# Patient Record
Sex: FEMALE | Race: WHITE | Employment: PART TIME | URBAN - METROPOLITAN AREA
[De-identification: names, ages, dates, MRNs, and addresses within clinical notes are randomized per-mention and may not be internally consistent; named-entity substitution may affect disease eponyms.]

---

## 2024-02-23 ENCOUNTER — HOSPITAL ENCOUNTER (INPATIENT)
Facility: HOSPITAL | Age: 74
LOS: 3 days | Discharge: HOME | End: 2024-02-26
Attending: STUDENT IN AN ORGANIZED HEALTH CARE EDUCATION/TRAINING PROGRAM | Admitting: HOSPITALIST
Payer: MEDICARE

## 2024-02-23 ENCOUNTER — APPOINTMENT (OUTPATIENT)
Dept: GENERAL RADIOLOGY | Facility: HOSPITAL | Age: 74
End: 2024-02-23
Attending: STUDENT IN AN ORGANIZED HEALTH CARE EDUCATION/TRAINING PROGRAM
Payer: MEDICARE

## 2024-02-23 DIAGNOSIS — R91.8 MASS OF LOWER LOBE OF LEFT LUNG: ICD-10-CM

## 2024-02-23 DIAGNOSIS — J44.1 COPD EXACERBATION (HCC): Primary | ICD-10-CM

## 2024-02-23 PROBLEM — J96.01 ACUTE RESPIRATORY FAILURE WITH HYPOXIA (HCC): Status: ACTIVE | Noted: 2024-02-23

## 2024-02-23 LAB
ADENOVIRUS PCR:: NOT DETECTED
ALBUMIN SERPL-MCNC: 3.4 G/DL (ref 3.4–5)
ALBUMIN/GLOB SERPL: 0.9 {RATIO} (ref 1–2)
ALP LIVER SERPL-CCNC: 100 U/L
ALT SERPL-CCNC: 16 U/L
ANION GAP SERPL CALC-SCNC: 7 MMOL/L (ref 0–18)
ARTERIAL PATENCY WRIST A: POSITIVE
AST SERPL-CCNC: 23 U/L (ref 15–37)
B PARAPERT DNA SPEC QL NAA+PROBE: NOT DETECTED
B PERT DNA SPEC QL NAA+PROBE: NOT DETECTED
BASE EXCESS BLDA CALC-SCNC: -2.6 MMOL/L (ref ?–2)
BASOPHILS # BLD AUTO: 0.02 X10(3) UL (ref 0–0.2)
BASOPHILS NFR BLD AUTO: 0.1 %
BILIRUB SERPL-MCNC: 0.7 MG/DL (ref 0.1–2)
BODY TEMPERATURE: 98.6 F
BUN BLD-MCNC: 26 MG/DL (ref 9–23)
C PNEUM DNA SPEC QL NAA+PROBE: NOT DETECTED
CA-I BLD-SCNC: 1.18 MMOL/L (ref 0.95–1.32)
CALCIUM BLD-MCNC: 9.1 MG/DL (ref 8.5–10.1)
CHLORIDE SERPL-SCNC: 106 MMOL/L (ref 98–112)
CO2 SERPL-SCNC: 23 MMOL/L (ref 21–32)
COHGB MFR BLD: 1.3 % SAT (ref 0–3)
CORONAVIRUS 229E PCR:: NOT DETECTED
CORONAVIRUS HKU1 PCR:: NOT DETECTED
CORONAVIRUS NL63 PCR:: NOT DETECTED
CORONAVIRUS OC43 PCR:: NOT DETECTED
CREAT BLD-MCNC: 1.27 MG/DL
EGFRCR SERPLBLD CKD-EPI 2021: 44 ML/MIN/1.73M2 (ref 60–?)
EOSINOPHIL # BLD AUTO: 0 X10(3) UL (ref 0–0.7)
EOSINOPHIL NFR BLD AUTO: 0 %
ERYTHROCYTE [DISTWIDTH] IN BLOOD BY AUTOMATED COUNT: 14.2 %
FIO2: 40 %
FLUAV + FLUBV RNA SPEC NAA+PROBE: NEGATIVE
FLUAV + FLUBV RNA SPEC NAA+PROBE: NEGATIVE
FLUAV RNA SPEC QL NAA+PROBE: NOT DETECTED
FLUBV RNA SPEC QL NAA+PROBE: NOT DETECTED
GLOBULIN PLAS-MCNC: 3.6 G/DL (ref 2.8–4.4)
GLUCOSE BLD-MCNC: 179 MG/DL (ref 70–99)
HCO3 BLDA-SCNC: 22.9 MEQ/L (ref 21–27)
HCT VFR BLD AUTO: 41.2 %
HGB BLD-MCNC: 14 G/DL
HGB BLD-MCNC: 14.1 G/DL
IMM GRANULOCYTES # BLD AUTO: 0.1 X10(3) UL (ref 0–1)
IMM GRANULOCYTES NFR BLD: 0.5 %
L/M: 40 L/MIN
LACTATE BLD-SCNC: 1.5 MMOL/L (ref 0.5–2)
LYMPHOCYTES # BLD AUTO: 0.4 X10(3) UL (ref 1–4)
LYMPHOCYTES NFR BLD AUTO: 2 %
MCH RBC QN AUTO: 28.5 PG (ref 26–34)
MCHC RBC AUTO-ENTMCNC: 34 G/DL (ref 31–37)
MCV RBC AUTO: 83.9 FL
METAPNEUMOVIRUS PCR:: NOT DETECTED
METHGB MFR BLD: 0.4 % SAT (ref 0.4–1.5)
MONOCYTES # BLD AUTO: 0.6 X10(3) UL (ref 0.1–1)
MONOCYTES NFR BLD AUTO: 3.1 %
MYCOPLASMA PNEUMONIA PCR:: NOT DETECTED
NEUTROPHILS # BLD AUTO: 18.5 X10 (3) UL (ref 1.5–7.7)
NEUTROPHILS # BLD AUTO: 18.5 X10(3) UL (ref 1.5–7.7)
NEUTROPHILS NFR BLD AUTO: 94.3 %
OSMOLALITY SERPL CALC.SUM OF ELEC: 291 MOSM/KG (ref 275–295)
OXYHGB MFR BLDA: 96.1 % (ref 92–100)
PARAINFLUENZA 1 PCR:: NOT DETECTED
PARAINFLUENZA 2 PCR:: NOT DETECTED
PARAINFLUENZA 3 PCR:: NOT DETECTED
PARAINFLUENZA 4 PCR:: NOT DETECTED
PCO2 BLDA: 29 MM HG (ref 35–45)
PH BLDA: 7.45 [PH] (ref 7.35–7.45)
PLATELET # BLD AUTO: 206 10(3)UL (ref 150–450)
PO2 BLDA: 86 MM HG (ref 80–100)
POTASSIUM BLD-SCNC: 3.4 MMOL/L (ref 3.6–5.1)
POTASSIUM SERPL-SCNC: 3.6 MMOL/L (ref 3.5–5.1)
PROCALCITONIN SERPL-MCNC: 0.95 NG/ML (ref ?–0.16)
PROT SERPL-MCNC: 7 G/DL (ref 6.4–8.2)
RBC # BLD AUTO: 4.91 X10(6)UL
RHINOVIRUS/ENTERO PCR:: DETECTED
RSV RNA SPEC NAA+PROBE: NEGATIVE
RSV RNA SPEC QL NAA+PROBE: NOT DETECTED
SARS-COV-2 RNA NPH QL NAA+NON-PROBE: NOT DETECTED
SARS-COV-2 RNA RESP QL NAA+PROBE: NOT DETECTED
SODIUM BLD-SCNC: 133 MMOL/L (ref 135–145)
SODIUM SERPL-SCNC: 136 MMOL/L (ref 136–145)
TROPONIN I SERPL HS-MCNC: 6 NG/L
WBC # BLD AUTO: 19.6 X10(3) UL (ref 4–11)

## 2024-02-23 PROCEDURE — 99223 1ST HOSP IP/OBS HIGH 75: CPT | Performed by: HOSPITALIST

## 2024-02-23 PROCEDURE — 5A0945A ASSISTANCE WITH RESPIRATORY VENTILATION, 24-96 CONSECUTIVE HOURS, HIGH NASAL FLOW/VELOCITY: ICD-10-PCS | Performed by: HOSPITALIST

## 2024-02-23 PROCEDURE — 5A09357 ASSISTANCE WITH RESPIRATORY VENTILATION, LESS THAN 24 CONSECUTIVE HOURS, CONTINUOUS POSITIVE AIRWAY PRESSURE: ICD-10-PCS | Performed by: HOSPITALIST

## 2024-02-23 PROCEDURE — 71045 X-RAY EXAM CHEST 1 VIEW: CPT | Performed by: STUDENT IN AN ORGANIZED HEALTH CARE EDUCATION/TRAINING PROGRAM

## 2024-02-23 RX ORDER — FLUTICASONE PROPIONATE 50 MCG
2 SPRAY, SUSPENSION (ML) NASAL DAILY
Status: DISCONTINUED | OUTPATIENT
Start: 2024-02-24 | End: 2024-02-26

## 2024-02-23 RX ORDER — CLONAZEPAM 0.5 MG/1
0.5 TABLET ORAL 2 TIMES DAILY PRN
Status: DISCONTINUED | OUTPATIENT
Start: 2024-02-23 | End: 2024-02-26

## 2024-02-23 RX ORDER — QUETIAPINE FUMARATE 25 MG/1
25 TABLET, FILM COATED ORAL NIGHTLY
Status: DISCONTINUED | OUTPATIENT
Start: 2024-02-23 | End: 2024-02-23

## 2024-02-23 RX ORDER — AZITHROMYCIN 250 MG/1
500 TABLET, FILM COATED ORAL ONCE
Status: COMPLETED | OUTPATIENT
Start: 2024-02-23 | End: 2024-02-23

## 2024-02-23 RX ORDER — METHYLPREDNISOLONE SODIUM SUCCINATE 40 MG/ML
40 INJECTION, POWDER, LYOPHILIZED, FOR SOLUTION INTRAMUSCULAR; INTRAVENOUS EVERY 8 HOURS
Status: DISCONTINUED | OUTPATIENT
Start: 2024-02-23 | End: 2024-02-26

## 2024-02-23 RX ORDER — ACETAMINOPHEN 500 MG
500 TABLET ORAL EVERY 4 HOURS PRN
Status: DISCONTINUED | OUTPATIENT
Start: 2024-02-23 | End: 2024-02-26

## 2024-02-23 RX ORDER — ATORVASTATIN CALCIUM 40 MG/1
40 TABLET, FILM COATED ORAL NIGHTLY
Status: DISCONTINUED | OUTPATIENT
Start: 2024-02-24 | End: 2024-02-26

## 2024-02-23 RX ORDER — CLONAZEPAM 0.5 MG/1
0.5 TABLET ORAL 2 TIMES DAILY PRN
COMMUNITY

## 2024-02-23 RX ORDER — POLYETHYLENE GLYCOL 3350 17 G/17G
17 POWDER, FOR SOLUTION ORAL DAILY PRN
Status: DISCONTINUED | OUTPATIENT
Start: 2024-02-23 | End: 2024-02-26

## 2024-02-23 RX ORDER — SODIUM CHLORIDE 9 MG/ML
INJECTION, SOLUTION INTRAVENOUS CONTINUOUS
Status: DISCONTINUED | OUTPATIENT
Start: 2024-02-23 | End: 2024-02-24

## 2024-02-23 RX ORDER — METHYLPREDNISOLONE SODIUM SUCCINATE 125 MG/2ML
125 INJECTION, POWDER, LYOPHILIZED, FOR SOLUTION INTRAMUSCULAR; INTRAVENOUS ONCE
Status: COMPLETED | OUTPATIENT
Start: 2024-02-23 | End: 2024-02-23

## 2024-02-23 RX ORDER — IPRATROPIUM BROMIDE AND ALBUTEROL SULFATE 2.5; .5 MG/3ML; MG/3ML
3 SOLUTION RESPIRATORY (INHALATION)
Status: DISCONTINUED | OUTPATIENT
Start: 2024-02-23 | End: 2024-02-26

## 2024-02-23 RX ORDER — MELATONIN
3 NIGHTLY PRN
Status: DISCONTINUED | OUTPATIENT
Start: 2024-02-23 | End: 2024-02-26

## 2024-02-23 RX ORDER — FLUTICASONE FUROATE AND VILANTEROL 200; 25 UG/1; UG/1
1 POWDER RESPIRATORY (INHALATION) DAILY
Status: DISCONTINUED | OUTPATIENT
Start: 2024-02-23 | End: 2024-02-26

## 2024-02-23 RX ORDER — ASPIRIN 325 MG
325 TABLET ORAL DAILY
Status: DISCONTINUED | OUTPATIENT
Start: 2024-02-23 | End: 2024-02-26

## 2024-02-23 RX ORDER — AMLODIPINE BESYLATE 10 MG/1
10 TABLET ORAL DAILY
COMMUNITY

## 2024-02-23 RX ORDER — BISACODYL 10 MG
10 SUPPOSITORY, RECTAL RECTAL
Status: DISCONTINUED | OUTPATIENT
Start: 2024-02-23 | End: 2024-02-26

## 2024-02-23 RX ORDER — HYDROCHLOROTHIAZIDE 12.5 MG/1
12.5 TABLET ORAL DAILY
Status: DISCONTINUED | OUTPATIENT
Start: 2024-02-23 | End: 2024-02-23

## 2024-02-23 RX ORDER — BUTALBITAL, ACETAMINOPHEN AND CAFFEINE 50; 325; 40 MG/1; MG/1; MG/1
1 TABLET ORAL EVERY 6 HOURS PRN
Status: DISCONTINUED | OUTPATIENT
Start: 2024-02-23 | End: 2024-02-26

## 2024-02-23 RX ORDER — AMLODIPINE BESYLATE 5 MG/1
10 TABLET ORAL DAILY
Status: DISCONTINUED | OUTPATIENT
Start: 2024-02-23 | End: 2024-02-26

## 2024-02-23 RX ORDER — ONDANSETRON 2 MG/ML
4 INJECTION INTRAMUSCULAR; INTRAVENOUS EVERY 6 HOURS PRN
Status: DISCONTINUED | OUTPATIENT
Start: 2024-02-23 | End: 2024-02-26

## 2024-02-23 RX ORDER — SENNOSIDES 8.6 MG
17.2 TABLET ORAL NIGHTLY PRN
Status: DISCONTINUED | OUTPATIENT
Start: 2024-02-23 | End: 2024-02-26

## 2024-02-23 RX ORDER — ENOXAPARIN SODIUM 100 MG/ML
30 INJECTION SUBCUTANEOUS DAILY
Status: DISCONTINUED | OUTPATIENT
Start: 2024-02-24 | End: 2024-02-24

## 2024-02-23 NOTE — ED INITIAL ASSESSMENT (HPI)
Patient presents with c/o three days of shortness of breath. Patient tachypneic on arrival with purse lipped breathing. Patient states she's having a \"pain like when you have pleurisy.\" Patient reports hx of COPD, emphysema. She has grandchildren that live with her that have been intermittently ill.

## 2024-02-23 NOTE — H&P
[facility]     Beata Sweeney Patient Status:  Emergency    1950 MRN [id number]   Location [facility] EMERGENCY DEPARTMENT Attending Rianna Doran MD   Hosp Day # 0 PCP Zelalem Rendon MD     Chief Complaint: SOB    Subjective:    History of Present Illness:     Beata Sweeney is a 73 year old female with a PMH of COPD and AAA presented to ED due to worsening SOB over the past 5 days or so. Reports dyspnea with minimal exertion, wheezing, and non-productive cough. Patient began taking some left over steroids about 5 days ago along with doing breathing treatments without much relief. Denies fever/chills. Denies N/V/D.    History/Other:    Past Medical History:  Past Medical History:   Diagnosis Date    COPD (chronic obstructive pulmonary disease) (HCC)      Past Surgical History:   History reviewed. No pertinent surgical history.   Family History:   No family history on file.  Social History:    reports that she quit smoking about 8 years ago. Her smoking use included cigarettes. She has a 51 pack-year smoking history. She has never used smokeless tobacco.     Allergies:   Allergies   Allergen Reactions    Lisinopril SWELLING     Lip/facial swelling     Sulfa Antibiotics RASH       Medications:    No current facility-administered medications on file prior to encounter.     Current Outpatient Medications on File Prior to Encounter   Medication Sig Dispense Refill    Albuterol Sulfate (PROAIR RESPICLICK) 108 (90 BASE) MCG/ACT Inhalation Aerosol Powder, Breath Activated Inhale 2 puffs into the lungs 4 (four) times daily as needed. 1 each 0    Umeclidinium Bromide (INCRUSE ELLIPTA) 62.5 MCG/INH Inhalation Aerosol Powder, Breath Activated Inhale 1 puff into the lungs daily. 1 each 0    Umeclidinium Bromide (INCRUSE ELLIPTA) 62.5 MCG/INH Inhalation Aerosol Powder, Breath Activated Inhale 1 puff into the lungs daily. 1 each 11    azithromycin 250 MG Oral Tab        predniSONE  10 MG Oral Tab Take 4 tabs po x 3 days then 3 tabs po x 3 days then 2 tabs po x 3 days then 1 tab po x 3 days 30 tablet 0    Tiotropium Bromide Monohydrate (SPIRIVA RESPIMAT) 2.5 MCG/ACT Inhalation Aero Soln Inhale 2 puffs into the lungs daily. 1 Inhaler 11    Fluticasone Propionate (FLONASE) 50 MCG/ACT Nasal Suspension 2 sprays by Each Nare route daily. 1 Inhaler 11    TAMOXIFEN CITRATE OR Take 10 mg by mouth.      Tamoxifen Citrate (NOLVADEX) 20 MG Oral Tab        ADVAIR DISKUS 500-50 MCG/DOSE Inhalation Aerosol Powder, Breath Activated        predniSONE (DELTASONE) 10 MG Oral Tab Take 4 tabs po x 3 days then 3 tabs po x 3 days then 2 tabs po x 3 days then 1 tab po x 3 days 30 tablet 0    Fluticasone Furoate-Vilanterol (BREO ELLIPTA) 100-25 MCG/INH Inhalation Aerosol Powder, Breath Activated Inhale 1 puff into the lungs daily. 1 each 0    Fluticasone Furoate-Vilanterol (BREO ELLIPTA) 100-25 MCG/INH Inhalation Aerosol Powder, Breath Activated Inhale 1 puff into the lungs daily. 1 each 11    predniSONE (DELTASONE) 10 MG Oral Tab Take 4 pills daily for 3 days, then take 3 pills daily for 3 days, then take 2 pills daily for 3 days, then take 1 pill daily for 3 days. 30 tablet 0    Albuterol Sulfate HFA (PROAIR HFA) 108 (90 BASE) MCG/ACT Inhalation Aero Soln Inhale  into the lungs as needed.      aspirin 325 MG Oral Tab Take 325 mg by mouth daily.      Butalbital-APAP-Caffeine (FIORICET, ESGIC) -40 MG Oral Tab Take 1 tablet by mouth as needed. As needed for migraines      diazepam (VALIUM) 5 MG Oral Tab   0    QUEtiapine Fumarate (SEROQUEL) 25 MG Oral Tab   11    Umeclidinium-Vilanterol (ANORO ELLIPTA) 62.5-25 MCG/INH Inhalation Aerosol Powder, Breath Activated Inhale 1 puff into the lungs daily. 1 each 11    Diltiazem HCl (CARDIZEM) 90 MG Oral Tab Take 90 mg by mouth 4 (four) times daily.      hydrochlorothiazide (HYDRODIURIL) 12.5 MG Oral Tab Take 12.5 mg by mouth daily.      Atorvastatin Calcium (LIPITOR) 40  MG Oral Tab Take 40 mg by mouth nightly.      methylphenidate (RITALIN) 10 MG Oral Tab Take 10 mg by mouth 2 (two) times daily.      ZOLOFT OR 50 mg Daily      VICODIN OR PRN         Review of Systems:   A comprehensive review of systems was completed.    Pertinent positives and negatives noted in the HPI.    Objective:   Physical Exam:    /81   Pulse 102   Temp 97.5 °F (36.4 °C) (Temporal)   Resp (!) 40   Ht 154.9 cm (5' 1\")   Wt 103 lb (46.7 kg)   SpO2 99%   BMI 19.46 kg/m²   General: No acute distress, Alert  Respiratory: Diminished with B/L wheezing.   Cardiovascular: S1, S2. Tachy.  Abdomen: Soft, Non-tender, non-distended, positive bowel sounds  Neuro: No new focal deficits  Extremities: No edema      Results:    Labs:      Labs Last 24 Hours:    Recent Labs   Lab 02/23/24  1559   RBC 4.91   HGB 14.0   HCT 41.2   MCV 83.9   MCH 28.5   MCHC 34.0   RDW 14.2   NEPRELIM 18.50*   WBC 19.6*   .0       Recent Labs   Lab 02/23/24  1559   *   BUN 26*   CREATSERUM 1.27*   EGFRCR 44*   CA 9.1   ALB 3.4      K 3.6      CO2 23.0   ALKPHO 100   AST 23   ALT 16   BILT 0.7   TP 7.0       No results found for: \"PT\", \"INR\"    Recent Labs   Lab 02/23/24  1559   TROPHS 6       No results for input(s): \"TROP\", \"PBNP\" in the last 168 hours.    No results for input(s): \"PCT\" in the last 168 hours.    Imaging: Imaging data reviewed in Epic.    Assessment & Plan:      #Acute hypoxic respiratory failure secondary to COPD exacerbation  -Nebs/steroids  -RVP  -Wean oxygen as able  -Continue home inhalers   -Pulmonary eval     #Leukocytosis   -Likely due to steroids taken PTA    #Known lung mass  -Patient aware though has not yet started workup of this     #AAA s/p repair 11/23    #Dyslipidemia  -Statin    Plan of care discussed with patient and ED physician.     Timmy Alberto DO    Supplementary Documentation:     The 21st Century Cures Act makes medical notes like these available to patients in the  interest of transparency. Please be advised this is a medical document. Medical documents are intended to carry relevant information, facts as evident, and the clinical opinion of the practitioner. The medical note is intended as peer to peer communication and may appear blunt or direct. It is written in medical language and may contain abbreviations or verbiage that are unfamiliar.

## 2024-02-23 NOTE — ED PROVIDER NOTES
Patient Seen in: [facility] Emergency Department      History     Chief Complaint   Patient presents with    Difficulty Breathing     Stated Complaint: natasha    Subjective:   HPI    The patient is a 73-year-old female with a history of COPD and remote prolonged smoking history presented to the emergency room with shortness of breath and wheezing.  Symptoms been progressively worsening over the last 4 days.  She states her grandchildren all have viral URIs.  Patient does not use oxygen at baseline however was hypoxic sent here from immediate care.    Objective:   Past Medical History:   Diagnosis Date    COPD (chronic obstructive pulmonary disease) (HCC)               History reviewed. No pertinent surgical history.             Social History     Socioeconomic History    Marital status:    Tobacco Use    Smoking status: Former     Packs/day: 1.00     Years: 51.00     Additional pack years: 0.00     Total pack years: 51.00     Types: Cigarettes     Quit date: 3/2/2015     Years since quittin.9    Smokeless tobacco: Never              Review of Systems    Positive for stated complaint: natasha  Other systems are as noted in HPI.  Constitutional and vital signs reviewed.      All other systems reviewed and negative except as noted above.    Physical Exam     ED Triage Vitals [24 1547]   BP (!) 116/98   Pulse 98   Resp 26   Temp 97.5 °F (36.4 °C)   Temp src Temporal   SpO2 97 %   O2 Device None (Room air)       Current:/81   Pulse 102   Temp 97.5 °F (36.4 °C) (Temporal)   Resp (!) 40   Ht 154.9 cm (5' 1\")   Wt 46.7 kg   SpO2 99%   BMI 19.46 kg/m²         Physical Exam  Vitals and nursing note reviewed.   HENT:      Head: Normocephalic and atraumatic.   Eyes:      Extraocular Movements: Extraocular movements intact.      Pupils: Pupils are equal, round, and reactive to light.   Cardiovascular:      Rate and Rhythm: Regular rhythm. Tachycardia present.   Pulmonary:      Effort: Tachypnea and  accessory muscle usage present.      Breath sounds: Wheezing present.   Chest:      Chest wall: No mass or tenderness.   Musculoskeletal:         General: Normal range of motion.      Cervical back: Normal range of motion and neck supple.   Neurological:      General: No focal deficit present.      Mental Status: She is oriented to person, place, and time.   Psychiatric:         Behavior: Behavior normal.      Comments: Anxious               ED Course     Labs Reviewed   COMP METABOLIC PANEL (14) - Abnormal; Notable for the following components:       Result Value    Glucose 179 (*)     BUN 26 (*)     Creatinine 1.27 (*)     eGFR-Cr 44 (*)     A/G Ratio 0.9 (*)     All other components within normal limits   ABG PANEL W ELECT AND LACTATE - Abnormal; Notable for the following components:    ABG pCO2 29 (*)     ABG Base Excess -2.6 (*)     Potassium Blood Gas 3.4 (*)     Sodium Blood Gas 133 (*)     All other components within normal limits   CBC W/ DIFFERENTIAL - Abnormal; Notable for the following components:    WBC 19.6 (*)     Neutrophil Absolute Prelim 18.50 (*)     Neutrophil Absolute 18.50 (*)     Lymphocyte Absolute 0.40 (*)     All other components within normal limits   TROPONIN I HIGH SENSITIVITY - Normal   SARS-COV-2/FLU A AND B/RSV BY PCR (GENEXPERT) - Normal    Narrative:     This test is intended for the qualitative detection and differentiation of SARS-CoV-2, influenza A, influenza B, and respiratory syncytial virus (RSV) viral RNA in nasopharyngeal or nares swabs from individuals suspected of respiratory viral infection consistent with COVID-19 by their healthcare provider. Signs and symptoms of respiratory viral infection due to SARS-CoV-2, influenza, and RSV can be similar.    Test performed using the Xpert Xpress SARS-CoV-2/FLU/RSV (real time RT-PCR)  assay on the GeneXpert instrument, [employer], [place] [address].   This test is being used under the Food and Drug Administration's Emergency Use  Authorization.    The authorized Fact Sheet for Healthcare Providers for this assay is available upon request from the laboratory.   CBC WITH DIFFERENTIAL WITH PLATELET    Narrative:     The following orders were created for panel order CBC With Differential With Platelet.  Procedure                               Abnormality         Status                     ---------                               -----------         ------                     CBC W/ DIFFERENTIAL[[id number]]          Abnormal            Final result                 Please view results for these tests on the individual orders.   SCAN SLIDE   RAINBOW DRAW LAVENDER   RAINBOW DRAW LIGHT GREEN   RAINBOW DRAW BLUE     EKG    Rate, intervals and axes as noted on EKG Report.  Rate: 97  Rhythm: Sinus Rhythm  Reading: Poor baseline, no ST elevations or depressions however lots of artifact                 XR CHEST AP PORTABLE  (CPT=71045)    Result Date: 2/23/2024  CONCLUSION:  The lungs are hyperinflated consistent with COPD change. There is a large area of masslike consolidation in the left lung base most consistent with pneumonia.  This should be followed to complete resolution.  No lymphadenopathy. There is some peribronchial thickening consistent with bronchitis.  Mild fibrotic changes are noted primary in the lung bases.  There is a calcified granuloma measuring 8 mm in the VÍCTOR.  There is some biapical parenchymal scarring noted. Normal heart size and vascularity. Degenerative change of the spine noted.   LOCATION:  [place] [address]      Dictated by (CST): Grey Parra MD on 2/23/2024 at 4:47 PM     Finalized by (CST): Grey Parra MD on 2/23/2024 at 4:48 PM               MDM      The differential includes the following  COPD exacerbation secondary to viral illness, pneumonia, I have also considered PE as well as CHF    Pertinent comorbidities include  As listed above    Pertinent social history includes  Listed above      ER course  Arrival patient  hypoxic and tachycardic and tachypneic with increased work of breathing with diffuse expiratory wheezes on exam and some diminished lower lung/left-sided fields.  Discussed BiPAP with the patient as well as continuous nebulizer and steroids as well as admission.  We attempted BiPAP however patient kept taking the mask off.  She has now been placed on high flow.  Given that she has expiratory wheezes and has sick contacts I believe her symptoms are most consistent with a COPD exacerbation and not a PE.    Patient chest x-ray shows a masslike consolidation left lung base.  The patient was aware of this finding which she calls an anomaly.  I asked her if she is aware what this means.  She states I know what this could be but I am not dealing with a right now.  Patient states last winter she had a AAA repair which caused her to have one of her kidneys removed and that was a lot for her to deal with.    She appears to be doing better on high flow and [facility]  I have also ordered ceftriaxone and azithromycin.      Labs  Leukocytosis of 19.6, lactic acid of 1.5, creatinine 1.27 with BUN of 26 and GFR 44  Negative for COVID flu and RSV    Imaging studies  I personally reviewed the following radiology study cxr and my independent interpretation is it  showed left lower lobe opacity/mass.    External data reviewed    Discussion of management with external providers  [facility]  Respiratory herapist      A total of 35 minutes of critical care time (exclusive of billable procedures) was administered to manage the patient's respiratory instability due to her COPD exacerbation with hypoxia.  This involved direct patient intervention, complex decision making, and/or extensive discussions with the patient, family, and clinical staff.        Admission disposition: 2/23/2024  5:15 PM                                        Medical Decision Making      Disposition and Plan     Clinical  Impression:  1. COPD exacerbation (HCC)    2. Mass of lower lobe of left lung         Disposition:  Admit  2/23/2024  5:15 pm    Follow-up:  No follow-up provider specified.        Medications Prescribed:  Current Discharge Medication List                            Hospital Problems       Present on Admission  Date Reviewed: 4/19/2016            ICD-10-CM Noted POA    * (Principal) COPD exacerbation (HCC) J44.1 2/23/2024 Unknown

## 2024-02-24 LAB
ANION GAP SERPL CALC-SCNC: 5 MMOL/L (ref 0–18)
ATRIAL RATE: 97 BPM
BASOPHILS # BLD AUTO: 0.03 X10(3) UL (ref 0–0.2)
BASOPHILS NFR BLD AUTO: 0.2 %
BUN BLD-MCNC: 22 MG/DL (ref 9–23)
CALCIUM BLD-MCNC: 8.7 MG/DL (ref 8.5–10.1)
CHLORIDE SERPL-SCNC: 108 MMOL/L (ref 98–112)
CO2 SERPL-SCNC: 23 MMOL/L (ref 21–32)
CREAT BLD-MCNC: 0.97 MG/DL
EGFRCR SERPLBLD CKD-EPI 2021: 61 ML/MIN/1.73M2 (ref 60–?)
EOSINOPHIL # BLD AUTO: 0 X10(3) UL (ref 0–0.7)
EOSINOPHIL NFR BLD AUTO: 0 %
ERYTHROCYTE [DISTWIDTH] IN BLOOD BY AUTOMATED COUNT: 14.2 %
GLUCOSE BLD-MCNC: 97 MG/DL (ref 70–99)
HCT VFR BLD AUTO: 39.4 %
HGB BLD-MCNC: 13.2 G/DL
IMM GRANULOCYTES # BLD AUTO: 0.12 X10(3) UL (ref 0–1)
IMM GRANULOCYTES NFR BLD: 0.6 %
LYMPHOCYTES # BLD AUTO: 0.38 X10(3) UL (ref 1–4)
LYMPHOCYTES NFR BLD AUTO: 1.9 %
MCH RBC QN AUTO: 28.7 PG (ref 26–34)
MCHC RBC AUTO-ENTMCNC: 33.5 G/DL (ref 31–37)
MCV RBC AUTO: 85.7 FL
MONOCYTES # BLD AUTO: 0.49 X10(3) UL (ref 0.1–1)
MONOCYTES NFR BLD AUTO: 2.5 %
NEUTROPHILS # BLD AUTO: 18.91 X10 (3) UL (ref 1.5–7.7)
NEUTROPHILS # BLD AUTO: 18.91 X10(3) UL (ref 1.5–7.7)
NEUTROPHILS NFR BLD AUTO: 94.8 %
OSMOLALITY SERPL CALC.SUM OF ELEC: 285 MOSM/KG (ref 275–295)
P AXIS: 81 DEGREES
P-R INTERVAL: 130 MS
PLATELET # BLD AUTO: 156 10(3)UL (ref 150–450)
POTASSIUM SERPL-SCNC: 3.6 MMOL/L (ref 3.5–5.1)
Q-T INTERVAL: 372 MS
QRS DURATION: 86 MS
QTC CALCULATION (BEZET): 472 MS
R AXIS: 17 DEGREES
RBC # BLD AUTO: 4.6 X10(6)UL
SODIUM SERPL-SCNC: 136 MMOL/L (ref 136–145)
T AXIS: 63 DEGREES
VENTRICULAR RATE: 97 BPM
WBC # BLD AUTO: 19.9 X10(3) UL (ref 4–11)

## 2024-02-24 PROCEDURE — 99233 SBSQ HOSP IP/OBS HIGH 50: CPT | Performed by: HOSPITALIST

## 2024-02-24 RX ORDER — METHYLPHENIDATE HYDROCHLORIDE 10 MG/1
20 TABLET ORAL
Status: DISCONTINUED | OUTPATIENT
Start: 2024-02-24 | End: 2024-02-26

## 2024-02-24 RX ORDER — CLOPIDOGREL BISULFATE 75 MG/1
75 TABLET ORAL DAILY
Status: DISCONTINUED | OUTPATIENT
Start: 2024-02-24 | End: 2024-02-26

## 2024-02-24 RX ORDER — BENZONATATE 100 MG/1
100 CAPSULE ORAL 3 TIMES DAILY
Status: DISCONTINUED | OUTPATIENT
Start: 2024-02-24 | End: 2024-02-26

## 2024-02-24 RX ORDER — ENOXAPARIN SODIUM 100 MG/ML
40 INJECTION SUBCUTANEOUS DAILY
Status: DISCONTINUED | OUTPATIENT
Start: 2024-02-24 | End: 2024-02-26

## 2024-02-24 RX ORDER — GUAIFENESIN 600 MG/1
600 TABLET, EXTENDED RELEASE ORAL 2 TIMES DAILY
Status: DISCONTINUED | OUTPATIENT
Start: 2024-02-24 | End: 2024-02-26

## 2024-02-24 NOTE — ED QUICK NOTES
Orders for admission, patient is aware of plan and ready to go upstairs. Any questions, please call ED RN Tere at extension [phone]    Vaccinated? Yes  Type of COVID test sent: Genex  COVID Suspicion level: Low      Titratable drug(s) infusing:   Rate: ceftrixaone @ 200 ml/hr.    LOC at time of transport: A&O x4    Other pertinent information: Was on Bipap, downgraded to High Flow, tolerating well    CIWA score=  NIH score=

## 2024-02-24 NOTE — CONSULTS
Critical Care H&P/Consult       NAME: Beata Sweeney - ROOM: [address] [place] [address]-A - MRN: [id number] - Age: 73 year old - :  1950    Date of Admission: 2024  3:41 PM  Admission Diagnosis: COPD exacerbation (HCC) [J44.1]  Mass of lower lobe of left lung [R91.8]  Reason for Consult: Acute Hypoxic Resp Failure      History of Present Illness: 74 y/o w/ h/o COPD, Tob abuse, Lung mass who presented to EDW w/ c/o SOB and wheezing.  She noted the symptoms about 4 days ago and they have gotten progressively worse.  Notes that she has been around her grandchildren who have all been sick recently.  In the ED she was noted to be hypoxic.  She was placed on BiPAP but could not tolerated it.  She was placed on vapotherm w/ improvement in her sats.  Her viral panel for flu/covid/RSV was negative.  She was started on IV steroids and abx and admitted for further care.  Currently she notes that her breathing is a little easier today than it was yesterday    Past Medical History:   Diagnosis Date    COPD (chronic obstructive pulmonary disease) (HCC)       History reviewed. No pertinent surgical history.   Medications Prior to Admission   Medication Sig Dispense Refill Last Dose    clonazePAM 0.5 MG Oral Tab Take 1 tablet (0.5 mg total) by mouth 2 (two) times daily as needed for Anxiety.   2024    amLODIPine 10 MG Oral Tab Take 1 tablet (10 mg total) by mouth daily.   2024    Albuterol Sulfate (PROAIR RESPICLICK) 108 (90 BASE) MCG/ACT Inhalation Aerosol Powder, Breath Activated Inhale 2 puffs into the lungs 4 (four) times daily as needed. 1 each 0 2024    Umeclidinium Bromide (INCRUSE ELLIPTA) 62.5 MCG/INH Inhalation Aerosol Powder, Breath Activated Inhale 1 puff into the lungs daily. 1 each 0 2024    Umeclidinium Bromide (INCRUSE ELLIPTA) 62.5 MCG/INH Inhalation Aerosol Powder, Breath Activated Inhale 1 puff into the lungs daily. 1 each 11 2024    azithromycin 250 MG Oral Tab     2024    predniSONE 10  MG Oral Tab Take 4 tabs po x 3 days then 3 tabs po x 3 days then 2 tabs po x 3 days then 1 tab po x 3 days 30 tablet 0 2/23/2024    Tiotropium Bromide Monohydrate (SPIRIVA RESPIMAT) 2.5 MCG/ACT Inhalation Aero Soln Inhale 2 puffs into the lungs daily. 1 Inhaler 11 2/23/2024    ADVAIR DISKUS 500-50 MCG/DOSE Inhalation Aerosol Powder, Breath Activated     2/22/2024    predniSONE (DELTASONE) 10 MG Oral Tab Take 4 tabs po x 3 days then 3 tabs po x 3 days then 2 tabs po x 3 days then 1 tab po x 3 days 30 tablet 0 2/23/2024    Fluticasone Furoate-Vilanterol (BREO ELLIPTA) 100-25 MCG/INH Inhalation Aerosol Powder, Breath Activated Inhale 1 puff into the lungs daily. 1 each 0 2/23/2024    Fluticasone Furoate-Vilanterol (BREO ELLIPTA) 100-25 MCG/INH Inhalation Aerosol Powder, Breath Activated Inhale 1 puff into the lungs daily. 1 each 11 2/23/2024    predniSONE (DELTASONE) 10 MG Oral Tab Take 4 pills daily for 3 days, then take 3 pills daily for 3 days, then take 2 pills daily for 3 days, then take 1 pill daily for 3 days. 30 tablet 0 2/23/2024    Albuterol Sulfate HFA (PROAIR HFA) 108 (90 BASE) MCG/ACT Inhalation Aero Soln Inhale  into the lungs as needed.   2/22/2024    aspirin 325 MG Oral Tab Take 1 tablet (325 mg total) by mouth daily.   2/23/2024    Butalbital-APAP-Caffeine (FIORICET, ESGIC) -40 MG Oral Tab Take 1 tablet by mouth as needed. As needed for migraines   Past Week    Umeclidinium-Vilanterol (ANORO ELLIPTA) 62.5-25 MCG/INH Inhalation Aerosol Powder, Breath Activated Inhale 1 puff into the lungs daily. 1 each 11 Past Week    Atorvastatin Calcium (LIPITOR) 40 MG Oral Tab Take 1 tablet (40 mg total) by mouth nightly.   2/23/2024    methylphenidate (RITALIN) 10 MG Oral Tab Take 1 tablet (10 mg total) by mouth 2 (two) times daily.   2/23/2024    VICODIN OR PRN   Past Week    Fluticasone Propionate (FLONASE) 50 MCG/ACT Nasal Suspension 2 sprays by Each Nare route daily. 1 Inhaler 11 Unknown    TAMOXIFEN  CITRATE OR Take 10 mg by mouth.   Unknown    Tamoxifen Citrate (NOLVADEX) 20 MG Oral Tab     Unknown    diazepam (VALIUM) 5 MG Oral Tab   0 Unknown    QUEtiapine Fumarate (SEROQUEL) 25 MG Oral Tab   11 Unknown    Diltiazem HCl (CARDIZEM) 90 MG Oral Tab Take 1 tablet (90 mg total) by mouth 4 (four) times daily.   Unknown    hydrochlorothiazide (HYDRODIURIL) 12.5 MG Oral Tab Take 1 tablet (12.5 mg total) by mouth daily.   Unknown    ZOLOFT OR 50 mg Daily   Unknown     Allergies   Allergen Reactions    Lisinopril SWELLING     Lip/facial swelling     Sulfa Antibiotics RASH       Social History:  Social History     Socioeconomic History    Marital status:      Spouse name: Not on file    Number of children: Not on file    Years of education: Not on file    Highest education level: Not on file   Occupational History    Not on file   Tobacco Use    Smoking status: Former     Packs/day: 1.00     Years: 51.00     Additional pack years: 0.00     Total pack years: 51.00     Types: Cigarettes     Quit date: 3/2/2015     Years since quittin.9    Smokeless tobacco: Never   Substance and Sexual Activity    Alcohol use: Not on file    Drug use: Not on file    Sexual activity: Not on file   Other Topics Concern    Not on file   Social History Narrative    Not on file     Social Determinants of Health     Financial Resource Strain: Not on file   Food Insecurity: No Food Insecurity (2024)    Food Insecurity     Food Insecurity: Never true   Transportation Needs: No Transportation Needs (2024)    Transportation Needs     Lack of Transportation: No   Physical Activity: Not on file   Stress: Not on file   Social Connections: Not on file   Housing Stability: Low Risk  (2024)    Housing Stability     Housing Instability: No     Housing Instability Emergency: Not on file        Family History:  History reviewed. No pertinent family history.     Home Medications:  Outpatient Medications Marked as Taking for the  2/23/24 encounter (Hospital Encounter)   Medication Sig Dispense Refill    clonazePAM 0.5 MG Oral Tab Take 1 tablet (0.5 mg total) by mouth 2 (two) times daily as needed for Anxiety.      amLODIPine 10 MG Oral Tab Take 1 tablet (10 mg total) by mouth daily.      Albuterol Sulfate (PROAIR RESPICLICK) 108 (90 BASE) MCG/ACT Inhalation Aerosol Powder, Breath Activated Inhale 2 puffs into the lungs 4 (four) times daily as needed. 1 each 0    Umeclidinium Bromide (INCRUSE ELLIPTA) 62.5 MCG/INH Inhalation Aerosol Powder, Breath Activated Inhale 1 puff into the lungs daily. 1 each 0    Umeclidinium Bromide (INCRUSE ELLIPTA) 62.5 MCG/INH Inhalation Aerosol Powder, Breath Activated Inhale 1 puff into the lungs daily. 1 each 11    azithromycin 250 MG Oral Tab        predniSONE 10 MG Oral Tab Take 4 tabs po x 3 days then 3 tabs po x 3 days then 2 tabs po x 3 days then 1 tab po x 3 days 30 tablet 0    Tiotropium Bromide Monohydrate (SPIRIVA RESPIMAT) 2.5 MCG/ACT Inhalation Aero Soln Inhale 2 puffs into the lungs daily. 1 Inhaler 11    ADVAIR DISKUS 500-50 MCG/DOSE Inhalation Aerosol Powder, Breath Activated        predniSONE (DELTASONE) 10 MG Oral Tab Take 4 tabs po x 3 days then 3 tabs po x 3 days then 2 tabs po x 3 days then 1 tab po x 3 days 30 tablet 0    Fluticasone Furoate-Vilanterol (BREO ELLIPTA) 100-25 MCG/INH Inhalation Aerosol Powder, Breath Activated Inhale 1 puff into the lungs daily. 1 each 0    Fluticasone Furoate-Vilanterol (BREO ELLIPTA) 100-25 MCG/INH Inhalation Aerosol Powder, Breath Activated Inhale 1 puff into the lungs daily. 1 each 11    predniSONE (DELTASONE) 10 MG Oral Tab Take 4 pills daily for 3 days, then take 3 pills daily for 3 days, then take 2 pills daily for 3 days, then take 1 pill daily for 3 days. 30 tablet 0    Albuterol Sulfate HFA (PROAIR HFA) 108 (90 BASE) MCG/ACT Inhalation Aero Soln Inhale  into the lungs as needed.      aspirin 325 MG Oral Tab Take 1 tablet (325 mg total) by mouth  daily.      Butalbital-APAP-Caffeine (FIORICET, ESGIC) -40 MG Oral Tab Take 1 tablet by mouth as needed. As needed for migraines      Umeclidinium-Vilanterol (ANORO ELLIPTA) 62.5-25 MCG/INH Inhalation Aerosol Powder, Breath Activated Inhale 1 puff into the lungs daily. 1 each 11    Atorvastatin Calcium (LIPITOR) 40 MG Oral Tab Take 1 tablet (40 mg total) by mouth nightly.      methylphenidate (RITALIN) 10 MG Oral Tab Take 1 tablet (10 mg total) by mouth 2 (two) times daily.      VICODIN OR PRN         Scheduled Medication:   guaiFENesin ER  600 mg Oral BID    benzonatate  100 mg Oral TID    methylphenidate  20 mg Oral 2 times per day    clopidogrel  75 mg Oral Daily    azithromycin  500 mg Intravenous Q24H    ampicillin-sulbactam  3 g Intravenous q6h    enoxaparin  40 mg Subcutaneous Daily    fluticasone furoate-vilanterol  1 puff Inhalation Daily    aspirin  325 mg Oral Daily    atorvastatin  40 mg Oral Nightly    fluticasone propionate  2 spray Each Nare Daily    umeclidinium bromide  1 puff Inhalation Daily    sertraline  50 mg Oral Daily    ipratropium-albuterol  3 mL Nebulization Q4H WA (5 times daily)    methylPREDNISolone  40 mg Intravenous Q8H    amLODIPine  10 mg Oral Daily     Continuous Infusing Medication:    PRN Medication:butalbital-acetaminophen-caffeine, melatonin, acetaminophen, polyethylene glycol (PEG 3350), sennosides, bisacodyl, ondansetron, clonazePAM     REVIEW OF SYSTEMS:   14 point ROS conducted, negative save for above     OBJECTIVE:  Vitals:    02/24/24 0511 02/24/24 0702 02/24/24 0742 02/24/24 0859   BP: 151/68 150/84     BP Location: Left arm Left arm     Pulse: 80 82  106   Resp: 24 18     Temp: 98.5 °F (36.9 °C) 98.4 °F (36.9 °C)     TempSrc: Oral Oral     SpO2: 99% 96% 96% (!) 89%   Weight: 130 lb (59 kg)      Height:           Oxygen Therapy  SpO2: (!) 89 %  O2 Device: High flow/High humidity  FiO2 (%): 30 %  O2 Flow Rate (L/min): 30 L/min  Pulse Oximetry Type:  Continuous  Oximetry Probe Site Changed: No              FiO2 (%): 30 %           Wt Readings from Last 3 Encounters:   02/24/24 130 lb (59 kg)   04/19/16 212 lb (96.2 kg)   03/23/16 200 lb (90.7 kg)         Intake/Output Summary (Last 24 hours) at 2/24/2024 0942  Last data filed at 2/24/2024 0859  Gross per 24 hour   Intake 220 ml   Output 600 ml   Net -380 ml       /58 (BP Location: Left arm)   Pulse 98   Temp 98.2 °F (36.8 °C) (Oral)   Resp 18   Ht 5' 1\" (1.549 m)   Wt 130 lb (59 kg)   SpO2 99%   BMI 24.56 kg/m²     General Appearance:    Alert, cooperative, appears stated age   Head:    Normocephalic, without obvious abnormality, atraumatic   Eyes:    PERRL, conjunctiva/corneas clear, EOM's intact, both eyes   Throat:   Lips, mucosa, and tongue normal; teeth and gums normal   Neck:   Supple, symmetrical, trachea midline, no adenopathy;        thyroid:  No enlargement/tenderness/nodules; no carotid    bruit or JVD   Back:     Symmetric, no curvature, ROM normal, no CVA tenderness   Lungs:     Coarse BS on right, improved w/ coughing   Chest wall:    No tenderness or deformity   Heart:    Regular rate and rhythm, S1 and S2 normal, no murmur, rub   or gallop   Abdomen:     Soft, non-tender, bowel sounds active all four quadrants,     no masses, no organomegaly   Extremities:   Extremities normal, atraumatic, no cyanosis or edema   Pulses:   2+ and symmetric all extremities   Skin:   Skin color, texture, turgor normal, no rashes or lesions   Neurologic:   CNII-XII intact. Normal strength, sensation and reflexes       throughout       Labs reviewed as noted below    Imaging: chest x-ray reviewed- lobulated opacity in LLL    ASSESSMENT/PLAN:  Acute Hypoxic Resp Failure  _due to COPD ex and possible PNA  -wean off vapotherm  -wean O2 as tolerated  COPD exacerbation  -cont IV steroids  -pt does not have a clear regimen that she takes regularly  -needs to be on ICS/LABA/LAMA  -cont chronic azithro  -BD  protocol  -would benefit from opt PFTs  ?PNA  -no blood cultures were done  -sputum culture not sent yet  -cont unasyn and azithro, abx started (2/23- ), if pt rapidly improves would be in favor of dc'ing abx  Lobulated lung mass  -no outside imaging for comparison but per reports was noted to have a lingular mass in 2022  -needs CT as opt and discuss possible biopsy        Medically Necessary and Clinically Appropriate Critical Care Time: 35 minutes                 [facility]  Pulmonary and Critical Care

## 2024-02-24 NOTE — PROGRESS NOTES
NURSING ADMISSION NOTE      Patient admitted via Cart  Oriented to room.  Safety precautions initiated.  Bed in low position.  Call light in reach.      From home with family. + Rhino/Entero Virus.  AO x4. Upper/lower dentures. Vapotherm, see flowsheets. Tele - NSR. Lovenox. FRANKLIN. Kian. Bedrest d/t O2 needs. PT baseline is upself. Uses walker for longer distances. Solumedrol. IVF infusing per MAR. Regular diet. Pt resting in bed with call light in reach. No further needs at this time.    Pulm consulted

## 2024-02-24 NOTE — PROGRESS NOTES
[facility]     Hospitalist Progress Note     Beata Sweeney Patient Status:  Inpatient    1950 MRN [id number]   Location [facility] 5NW-A Attending Nitish Dinh MD   Hosp Day # 1 PCP Zelalem Rendon MD     Chief Complaint: COPD    Subjective:   Patient states breathing has improved since admission. No chest pain. Admits to cough. No fever. No nausea, vomiting, diarrhea.     Current medications:   guaiFENesin ER  600 mg Oral BID    benzonatate  100 mg Oral TID    methylphenidate  20 mg Oral 2 times per day    clopidogrel  75 mg Oral Daily    enoxaparin  40 mg Subcutaneous Daily    fluticasone furoate-vilanterol  1 puff Inhalation Daily    aspirin  325 mg Oral Daily    atorvastatin  40 mg Oral Nightly    fluticasone propionate  2 spray Each Nare Daily    umeclidinium bromide  1 puff Inhalation Daily    sertraline  50 mg Oral Daily    ipratropium-albuterol  3 mL Nebulization Q4H WA (5 times daily)    methylPREDNISolone  40 mg Intravenous Q8H    amLODIPine  10 mg Oral Daily       Objective:    Review of Systems:   10 point ROS completed and was negative, except for pertinent positive and negatives stated in subjective.    Vital signs:  Temp:  [97.5 °F (36.4 °C)-98.6 °F (37 °C)] 98.2 °F (36.8 °C)  Pulse:  [] 98  Resp:  [18-40] 18  BP: (116-168)/(51-98) 139/58  SpO2:  [89 %-99 %] 97 %  FiO2 (%):  [30 %-40 %] 30 %  Patient Weight for the past 72 hrs:   Weight   24 1547 103 lb (46.7 kg)   24 0511 130 lb (59 kg)     Physical Exam:    General: No acute distress.   Respiratory: Rhonchi on left  Cardiovascular: S1, S2. Regular rate and rhythm.   Abdomen: Soft, nontender, nondistended.  Positive bowel sounds.  Extremities: No edema.  Neuro: AAOx3    Diagnostic Data:    Labs:  Recent Labs   Lab 24  1559 24  0727   WBC 19.6* 19.9*   HGB 14.0 13.2   MCV 83.9 85.7   .0 156.0       Recent Labs   Lab 24  1559 24  0725   * 97   BUN 26* 22   CREATSERUM 1.27*  0.97   CA 9.1 8.7   ALB 3.4  --     136   K 3.6 3.6    108   CO2 23.0 23.0   ALKPHO 100  --    AST 23  --    ALT 16  --    BILT 0.7  --    TP 7.0  --        Estimated Creatinine Clearance: 38.4 mL/min (based on SCr of 0.97 mg/dL).    No results for input(s): \"PTP\", \"INR\" in the last 168 hours.         COVID-19 Lab Results    COVID-19  Lab Results   Component Value Date    COVID19 Not Detected 02/23/2024    COVID19 Not Detected 02/23/2024       Pro-Calcitonin  Recent Labs   Lab 02/23/24  1559   PCT 0.95*       Cardiac  No results for input(s): \"TROP\", \"PBNP\" in the last 168 hours.    Creatinine Kinase  No results for input(s): \"CK\" in the last 168 hours.    Inflammatory Markers  No results for input(s): \"CRP\", \"HARVEY\", \"LDH\", \"DDIMER\" in the last 168 hours.    Recent Labs   Lab 02/23/24  1559   TROPHS 6       Imaging: Imaging data reviewed in Epic.    Medications:    guaiFENesin ER  600 mg Oral BID    benzonatate  100 mg Oral TID    methylphenidate  20 mg Oral 2 times per day    clopidogrel  75 mg Oral Daily    enoxaparin  40 mg Subcutaneous Daily    fluticasone furoate-vilanterol  1 puff Inhalation Daily    aspirin  325 mg Oral Daily    atorvastatin  40 mg Oral Nightly    fluticasone propionate  2 spray Each Nare Daily    umeclidinium bromide  1 puff Inhalation Daily    sertraline  50 mg Oral Daily    ipratropium-albuterol  3 mL Nebulization Q4H WA (5 times daily)    methylPREDNISolone  40 mg Intravenous Q8H    amLODIPine  10 mg Oral Daily       Assessment & Plan:    Acute hypoxic respiratory failure d/t COPD exacerbation d/t Rhino/Entero virus, PCT 0.95  Leukocytosis  Stop IV abx   IV steroids  BD  Nebs  Oxygen - wean off Vapo 30L 30%  Antitussives  Incentive spirometry  Flutter   Isolation  Pulmonary consult  Left lung mass   Outpatient follow-up   AAA sp repair  Essential hypertension  Dyslipidemia  Aspirin  Resume Plavix  Norvasc  Lipitor  Monitor hemodynamics     Supplementary Documentation:    Quality:  DVT Prophylaxis: Lovenox    At this point Ms. Sweeney is expected to be discharge to: Home    Plan of care discussed with patient, RN and pulmonologist.     Nitish Dinh MD        **Certification      PHYSICIAN Certification of Need for Inpatient Hospitalization - Initial Certification    Patient will require inpatient services that will reasonably be expected to span two midnight's based on the clinical documentation in H+P.   Based on patients current state of illness, I anticipate that, after discharge, patient will require TBD.

## 2024-02-24 NOTE — PLAN OF CARE
Patient is AO x 4. Received on vapotherm 30L/30%, patient weaned down to 3L NC. Denies pain/discomfort. Occasional anxiety, PRNs administered see MAR. IV steriods. Good appetite for meals. Up SBA to chair for meals & toileting, continent of b/b. Flutter & IS. Patient updated on POC, rounded on routinely.     Problem: Patient/Family Goals  Goal: Patient/Family Long Term Goal  Description: Patient's Long Term Goal:   2/24 Days: Return home without needing supplemental O2    Interventions:  - Wean O2 as tolerated  - Pulmonary care IS/flutter  - Nebs  - IV steroids  - See additional Care Plan goals for specific interventions  Outcome: Progressing  Goal: Patient/Family Short Term Goal  Description: Patient's Short Term Goal:   2/24: Wean off Vapotherm to HFNC    Interventions:   - Wean O2 as tolerated  - IV steroids  - Nebs  - Up to chair for all meals/BSC   - Flutter/IS  - See additional Care Plan goals for specific interventions  Outcome: Progressing     Problem: RESPIRATORY - ADULT  Goal: Achieves optimal ventilation and oxygenation  Description: INTERVENTIONS:  - Assess for changes in respiratory status  - Assess for changes in mentation and behavior  - Position to facilitate oxygenation and minimize respiratory effort  - Oxygen supplementation based on oxygen saturation or ABGs  - Provide Smoking Cessation handout, if applicable  - Encourage broncho-pulmonary hygiene including cough, deep breathe, Incentive Spirometry  - Assess the need for suctioning and perform as needed  - Assess and instruct to report SOB or any respiratory difficulty  - Respiratory Therapy support as indicated  - Manage/alleviate anxiety  - Monitor for signs/symptoms of CO2 retention  Outcome: Progressing

## 2024-02-25 PROCEDURE — 99232 SBSQ HOSP IP/OBS MODERATE 35: CPT | Performed by: HOSPITALIST

## 2024-02-25 RX ORDER — HYDROCODONE BITARTRATE AND ACETAMINOPHEN 5; 325 MG/1; MG/1
1 TABLET ORAL EVERY 6 HOURS PRN
Status: DISCONTINUED | OUTPATIENT
Start: 2024-02-25 | End: 2024-02-26

## 2024-02-25 NOTE — PROGRESS NOTES
Patient now weaned to 1L of supplemental O2 - saturating between 91 to 92%.     She is complaining of pain to the left side of her back.  She is unable to rate it -  \"the pain is there and it just bothers me\".  She was offered Tylenol but she refused. \"I usually take Norco at home.  Im afraid to take Tylenol coz I worry about my liver.  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  She has Vicodin.  For right now, she just wants a hot pack.  Will page MD for pain med orders if pain does not improve.

## 2024-02-25 NOTE — PLAN OF CARE
Patient is AO x 4. Maintains O2 sats on room air, but sometimes requiring 1L. VARGAS, but improving overall per pt. Cough minimal, non-productive. Patient with pain to left back/rib cage, lidoderm applied & PRN norco administered with relief. IV steroids, plan to switch to PO in AM. Weaning O2 as tolerated. Good appetite for meals. Continent of b/b, uses purwick when feeling more dyspnic. Patient & family at bedside updated on POC, rounded on routinely.     Problem: Patient/Family Goals  Goal: Patient/Family Long Term Goal  Description: Patient's Long Term Goal:   2/24 Days: Return home without needing supplemental O2    Interventions:  - Wean O2 as tolerated  - Pulmonary care IS/flutter  - Nebs  - IV steroids  - See additional Care Plan goals for specific interventions  Outcome: Progressing  Goal: Patient/Family Short Term Goal  Description: Patient's Short Term Goal:   2/24: Wean off Vapotherm to HFNC  02/24/2024 - wean O2    Interventions:   - Wean O2 as tolerated  - IV steroids  - Nebs  - Up to chair for all meals/BSC   - Flutter/IS  - wean O2 to keep sats between 89 to 92  - See additional Care Plan goals for specific interventions  Outcome: Progressing     Problem: RESPIRATORY - ADULT  Goal: Achieves optimal ventilation and oxygenation  Description: INTERVENTIONS:  - Assess for changes in respiratory status  - Assess for changes in mentation and behavior  - Position to facilitate oxygenation and minimize respiratory effort  - Oxygen supplementation based on oxygen saturation or ABGs  - Provide Smoking Cessation handout, if applicable  - Encourage broncho-pulmonary hygiene including cough, deep breathe, Incentive Spirometry  - Assess the need for suctioning and perform as needed  - Assess and instruct to report SOB or any respiratory difficulty  - Respiratory Therapy support as indicated  - Manage/alleviate anxiety  - Monitor for signs/symptoms of CO2 retention  Outcome: Progressing

## 2024-02-25 NOTE — PROGRESS NOTES
Pulmonary Progress Note        NAME: Beata Sweeney - ROOM: [address] [place] [address] - MRN: [id number] - Age: 73 year old - : 1950        Last 24hrs: No events overnight, much improved today, O2 needs are better, wants to get up and move around    OBJECTIVE:  Vitals:    24 0030 24 0629 24 0718 24 0736   BP: 143/66 149/60  154/63   BP Location: Left arm Left arm  Left arm   Pulse: 85 81  82   Resp: 18 18  18   Temp: 98.1 °F (36.7 °C) 97.8 °F (36.6 °C)  98 °F (36.7 °C)   TempSrc: Oral Oral  Oral   SpO2: 99% 99% 99% 97%   Weight:       Height:           Oxygen Therapy  SpO2: 97 %  O2 Device: High flow nasal cannula  FiO2 (%): 30 %  O2 Flow Rate (L/min): 1 L/min  Pulse Oximetry Type: Continuous  Oximetry Probe Site Changed: No  Pulse Ox Probe Location: Left hand                  Intake/Output Summary (Last 24 hours) at 2024 0838  Last data filed at 2024 0629  Gross per 24 hour   Intake 120 ml   Output 800 ml   Net -680 ml       Scheduled Medication:   lidocaine-menthol  1 patch Transdermal Daily    guaiFENesin ER  600 mg Oral BID    benzonatate  100 mg Oral TID    methylphenidate  20 mg Oral 2 times per day    clopidogrel  75 mg Oral Daily    enoxaparin  40 mg Subcutaneous Daily    fluticasone furoate-vilanterol  1 puff Inhalation Daily    aspirin  325 mg Oral Daily    atorvastatin  40 mg Oral Nightly    fluticasone propionate  2 spray Each Nare Daily    umeclidinium bromide  1 puff Inhalation Daily    sertraline  50 mg Oral Daily    ipratropium-albuterol  3 mL Nebulization Q4H WA (5 times daily)    methylPREDNISolone  40 mg Intravenous Q8H    amLODIPine  10 mg Oral Daily     Continuous Infusing Medication:    Lungs:  exp wheeze on right, cleared w/ coughing  Heart: S1, S2 normal, no murmur, click, rub or gallop, regular rate and rhythm  Abdomen: soft, non-tender; bowel sounds normal; no masses,  no organomegaly  Extremities: extremities normal, atraumatic, no cyanosis or edema    Labs  reviewed as noted below      ASSESSMENT/PLAN:    Acute Hypoxic Resp Failure  _due to COPD ex and possible PNA  -improved  -wean O2 as tolerated  COPD exacerbation  -cont IV steroids, transition to PO tomorrow  -pt does not have a clear inhaler regimen that she takes regularly  -needs to be on ICS/LABA/LAMA  -cont chronic azithro  -BD protocol  -would benefit from opt PFTs  ?PNA  -no blood cultures were done  -sputum culture not sent yet  -agree w/ stopping abx  Lobulated lung mass  -no outside imaging for comparison but per reports was noted to have a lingular mass in 2022  -needs CT, possible PET as opt and discuss possible biopsy  Dispo  -potential dc tomorrow if dyspnea improves further      Johnny Kulkarni  [facility]  Pulmonary and Critical Care

## 2024-02-25 NOTE — PLAN OF CARE
Problem: Patient/Family Goals  Goal: Patient/Family Long Term Goal  Description: Patient's Long Term Goal:   2/24 Days: Return home without needing supplemental O2    Interventions:  - Wean O2 as tolerated  - Pulmonary care IS/flutter  - Nebs  - IV steroids  - See additional Care Plan goals for specific interventions  Outcome: Progressing  Goal: Patient/Family Short Term Goal  Description: Patient's Short Term Goal:   2/24: Wean off Vapotherm to HFNC  02/24/2024 - wean O2    Interventions:   - Wean O2 as tolerated  - IV steroids  - Nebs  - Up to chair for all meals/BSC   - Flutter/IS  - wean O2 to keep sats between 89 to 92  - See additional Care Plan goals for specific interventions  Outcome: Progressing     Problem: RESPIRATORY - ADULT  Goal: Achieves optimal ventilation and oxygenation  Description: INTERVENTIONS:  - Assess for changes in respiratory status  - Assess for changes in mentation and behavior  - Position to facilitate oxygenation and minimize respiratory effort  - Oxygen supplementation based on oxygen saturation or ABGs  - Provide Smoking Cessation handout, if applicable  - Encourage broncho-pulmonary hygiene including cough, deep breathe, Incentive Spirometry  - Assess the need for suctioning and perform as needed  - Assess and instruct to report SOB or any respiratory difficulty  - Respiratory Therapy support as indicated  - Manage/alleviate anxiety  - Monitor for signs/symptoms of CO2 retention  Outcome: Progressing

## 2024-02-25 NOTE — PROGRESS NOTES
[facility]     Hospitalist Progress Note     Beata Sweeney Patient Status:  Inpatient    1950 MRN [id number]   Location [facility] 5NW-A Attending Nitish Dinh MD   Hosp Day # 2 PCP Zelalem Rendon MD     Chief Complaint: COPD    Subjective:   Patient states she is breathing better. Down to 1L via NC. No significant cough. No nausea, vomiting, diarrhea.   Left sided back pain, heat packs effective.    Current medications:   lidocaine-menthol  1 patch Transdermal Daily    guaiFENesin ER  600 mg Oral BID    benzonatate  100 mg Oral TID    methylphenidate  20 mg Oral 2 times per day    clopidogrel  75 mg Oral Daily    enoxaparin  40 mg Subcutaneous Daily    fluticasone furoate-vilanterol  1 puff Inhalation Daily    aspirin  325 mg Oral Daily    atorvastatin  40 mg Oral Nightly    fluticasone propionate  2 spray Each Nare Daily    umeclidinium bromide  1 puff Inhalation Daily    ipratropium-albuterol  3 mL Nebulization Q4H WA (5 times daily)    methylPREDNISolone  40 mg Intravenous Q8H    amLODIPine  10 mg Oral Daily       Objective:    Review of Systems:   10 point ROS completed and was negative, except for pertinent positive and negatives stated in subjective.    Vital signs:  Temp:  [97.8 °F (36.6 °C)-98.6 °F (37 °C)] 98 °F (36.7 °C)  Pulse:  [] 82  Resp:  [17-18] 18  BP: (139-154)/(58-67) 154/63  SpO2:  [96 %-100 %] 97 %  Patient Weight for the past 72 hrs:   Weight   24 1547 103 lb (46.7 kg)   24 0511 130 lb (59 kg)     Physical Exam:    General: No acute distress.   Respiratory: Diminished breath sounds  Cardiovascular: S1, S2. Regular rate and rhythm.   Abdomen: Soft, nontender, nondistended.  Positive bowel sounds.  Extremities: No edema.  Neuro: AAOx3    Diagnostic Data:    Labs:  Recent Labs   Lab 24  1559 24  0727   WBC 19.6* 19.9*   HGB 14.0 13.2   MCV 83.9 85.7   .0 156.0       Recent Labs   Lab 24  1559 24  0725   * 97   BUN  26* 22   CREATSERUM 1.27* 0.97   CA 9.1 8.7   ALB 3.4  --     136   K 3.6 3.6    108   CO2 23.0 23.0   ALKPHO 100  --    AST 23  --    ALT 16  --    BILT 0.7  --    TP 7.0  --        Estimated Creatinine Clearance: 38.4 mL/min (based on SCr of 0.97 mg/dL).    No results for input(s): \"PTP\", \"INR\" in the last 168 hours.         COVID-19 Lab Results    COVID-19  Lab Results   Component Value Date    COVID19 Not Detected 02/23/2024    COVID19 Not Detected 02/23/2024       Pro-Calcitonin  Recent Labs   Lab 02/23/24  1559   PCT 0.95*       Cardiac  No results for input(s): \"TROP\", \"PBNP\" in the last 168 hours.    Creatinine Kinase  No results for input(s): \"CK\" in the last 168 hours.    Inflammatory Markers  No results for input(s): \"CRP\", \"HARVEY\", \"LDH\", \"DDIMER\" in the last 168 hours.    Recent Labs   Lab 02/23/24  1559   TROPHS 6       Imaging: Imaging data reviewed in Epic.    Medications:    lidocaine-menthol  1 patch Transdermal Daily    guaiFENesin ER  600 mg Oral BID    benzonatate  100 mg Oral TID    methylphenidate  20 mg Oral 2 times per day    clopidogrel  75 mg Oral Daily    enoxaparin  40 mg Subcutaneous Daily    fluticasone furoate-vilanterol  1 puff Inhalation Daily    aspirin  325 mg Oral Daily    atorvastatin  40 mg Oral Nightly    fluticasone propionate  2 spray Each Nare Daily    umeclidinium bromide  1 puff Inhalation Daily    ipratropium-albuterol  3 mL Nebulization Q4H WA (5 times daily)    methylPREDNISolone  40 mg Intravenous Q8H    amLODIPine  10 mg Oral Daily       Assessment & Plan:    Acute hypoxic respiratory failure d/t COPD exacerbation d/t Rhino/Entero virus, PCT 0.95  Leukocytosis  IV steroids > Prednisone tomorrow  BD  Nebs  Oxygen - wean as able, currently 1L   Antitussives  Incentive spirometry  Flutter   Isolation  Pulmonary consult  Back pain  Analgesics added  Supportive measures  Left lung mass   Outpatient follow-up   AAA sp repair  Essential  hypertension  Dyslipidemia  Aspirin  Plavix  Norvasc  Lipitor  Monitor hemodynamics     Supplementary Documentation:   Quality:  DVT Prophylaxis: Lovenox    Discharge planning, possibly home tomorrow.     Plan of care discussed with patient, Rn ad pulmonologist.     Nitish Dinh MD

## 2024-02-25 NOTE — PROGRESS NOTES
Patient A&Ox4.  Reports she is feeling  \"100% better\" and she is no longer coughing.  She is saturating in the high 90's on O2 at 3L/NC - will wean to keep sats between 89 to 92.  +conversational dyspnea. IS encouraged. Klonopin was given per her request \"for anxiety\". Instructed on deep breathing exercises.  She is tolerating her diet.  Purewick in place.  IV steroids as ordered.  Nebs per RT.  Safety and droplet isolation precautions ion place.  Will follow.

## 2024-02-25 NOTE — SPIRITUAL CARE NOTE
Epic request for POA.  Patient wishes to do this, but asked for the forms to review and to contact Spiritual Care when she wishes to sign the document and have it witnessed.  She requested a blessing and I offered her one.  For further assistance please contact Spiritual Care at [phone].

## 2024-02-26 VITALS
BODY MASS INDEX: 24.55 KG/M2 | OXYGEN SATURATION: 97 % | RESPIRATION RATE: 20 BRPM | HEIGHT: 61 IN | SYSTOLIC BLOOD PRESSURE: 153 MMHG | HEART RATE: 99 BPM | TEMPERATURE: 98 F | DIASTOLIC BLOOD PRESSURE: 63 MMHG | WEIGHT: 130 LBS

## 2024-02-26 PROCEDURE — 99239 HOSP IP/OBS DSCHRG MGMT >30: CPT | Performed by: HOSPITALIST

## 2024-02-26 RX ORDER — FLUTICASONE FUROATE, UMECLIDINIUM BROMIDE AND VILANTEROL TRIFENATATE 100; 62.5; 25 UG/1; UG/1; UG/1
1 POWDER RESPIRATORY (INHALATION) DAILY
Qty: 1 EACH | Refills: 0 | Status: SHIPPED | OUTPATIENT
Start: 2024-02-26 | End: 2024-02-26

## 2024-02-26 RX ORDER — IPRATROPIUM BROMIDE AND ALBUTEROL SULFATE 2.5; .5 MG/3ML; MG/3ML
3 SOLUTION RESPIRATORY (INHALATION)
Status: DISCONTINUED | OUTPATIENT
Start: 2024-02-26 | End: 2024-02-26

## 2024-02-26 RX ORDER — FLUTICASONE FUROATE, UMECLIDINIUM BROMIDE AND VILANTEROL TRIFENATATE 100; 62.5; 25 UG/1; UG/1; UG/1
1 POWDER RESPIRATORY (INHALATION) DAILY
Qty: 1 EACH | Refills: 0 | Status: SHIPPED | OUTPATIENT
Start: 2024-02-26

## 2024-02-26 RX ORDER — PREDNISONE 10 MG/1
TABLET ORAL
Qty: 30 TABLET | Refills: 0 | Status: SHIPPED | OUTPATIENT
Start: 2024-02-26 | End: 2024-02-26

## 2024-02-26 RX ORDER — PREDNISONE 10 MG/1
TABLET ORAL
Qty: 30 TABLET | Refills: 0 | Status: SHIPPED | OUTPATIENT
Start: 2024-02-26

## 2024-02-26 RX ORDER — CLOPIDOGREL BISULFATE 75 MG/1
75 TABLET ORAL DAILY
Status: SHIPPED | COMMUNITY
Start: 2024-02-27

## 2024-02-26 NOTE — PROGRESS NOTES
[facility]    Beata Sweeney Patient Status:  Inpatient    1950 MRN [id number]   Location [facility] 5NW-A Attending Nitish Dinh MD   Hosp Day # 3 PCP Zelalem Rendon MD     SUBJECTIVE: doing really well and wants to go home. Has very slight epistaxis from O2     OBJECTIVE:  /64   Pulse 98   Temp 98.8 °F (37.1 °C) (Oral)   Resp 19   Ht 154.9 cm (5' 1\")   Wt 130 lb (59 kg)   SpO2 97%   BMI 24.56 kg/m²   O2 requirement: on room air     I/O last 3 completed shifts:  In: -   Out: 1600 [Urine:1600]  No intake/output data recorded.     Current Medications:   Current Facility-Administered Medications:     ipratropium-albuterol (Duoneb) 0.5-2.5 (3) MG/3ML inhalation solution 3 mL, 3 mL, Nebulization, QID    HYDROcodone-acetaminophen (Norco) 5-325 MG per tab 1 tablet, 1 tablet, Oral, Q6H PRN    lidocaine-menthol 4-1 % patch 1 patch, 1 patch, Transdermal, Daily    guaiFENesin ER (Mucinex) 12 hr tab 600 mg, 600 mg, Oral, BID    benzonatate (Tessalon) cap 100 mg, 100 mg, Oral, TID    methylphenidate (Ritalin) tab 20 mg, 20 mg, Oral, 2 times per day    clopidogrel (Plavix) tab 75 mg, 75 mg, Oral, Daily    enoxaparin (Lovenox) 40 MG/0.4ML SUBQ injection 40 mg, 40 mg, Subcutaneous, Daily    fluticasone furoate-vilanterol (Breo Ellipta) 200-25 MCG/ACT inhaler 1 puff, 1 puff, Inhalation, Daily    aspirin tab 325 mg, 325 mg, Oral, Daily    atorvastatin (Lipitor) tab 40 mg, 40 mg, Oral, Nightly    butalbital-acetaminophen-caffeine (Fioricet) -40 MG per tab 1 tablet, 1 tablet, Oral, Q6H PRN    fluticasone propionate (Flonase) 50 MCG/ACT nasal suspension 2 spray, 2 spray, Each Nare, Daily    umeclidinium bromide (Incruse Ellipta) 62.5 MCG/ACT inhaler 1 puff, 1 puff, Inhalation, Daily    methylPREDNISolone sodium succinate (Solu-MEDROL) injection 40 mg, 40 mg, Intravenous, Q8H    melatonin tab 3 mg, 3 mg, Oral, Nightly PRN    acetaminophen (Tylenol Extra Strength) tab 500 mg, 500 mg, Oral, Q4H  PRN    polyethylene glycol (PEG 3350) (Miralax) 17 g oral packet 17 g, 17 g, Oral, Daily PRN    sennosides (Senokot) tab 17.2 mg, 17.2 mg, Oral, Nightly PRN    bisacodyl (Dulcolax) 10 MG rectal suppository 10 mg, 10 mg, Rectal, Daily PRN    ondansetron (Zofran) 4 MG/2ML injection 4 mg, 4 mg, Intravenous, Q6H PRN    amLODIPine (Norvasc) tab 10 mg, 10 mg, Oral, Daily    clonazePAM (KlonoPIN) tab 0.5 mg, 0.5 mg, Oral, BID PRN     General appearance: alert, appears stated age, and cooperative  Lungs: diminished breath sounds bilaterally  Heart: regular rate and rhythm  Abdomen: soft, non-tender; bowel sounds normal; no masses,  no organomegaly  Extremities: extremities normal, atraumatic, no cyanosis or edema           No results found for: \"PT\", \"INR\"       Imaging: reviewed. Per EMR     ASSESSMENT/PLAN:     Acute Hypoxic Resp Failure- due to COPD ex and possible PNA  -improved  -wean O2 as tolerated- on room air  COPD exacerbation  -prednisone slow taper  -pt does not have a clear inhaler regimen that she takes regularly  -needs to be on ICS/LABA/LAMA- start trelegy 100 every day- rinse after use  -cont chronic azithro  -BD protocol  -would benefit from opt PFTs  ?PNA  -no blood cultures were done  -sputum culture not sent yet  -agree w/ stopping abx  Lobulated lung mass  -no outside imaging for comparison but per reports was noted to have a lingular mass in 2022  -needs CT, possible PET as opt and discuss possible biopsy  Dispo  -stable for GA home today  - f/u with Dr. Kulkarni in one week    Viet Huizar MD  2/26/2024  2:44 PM

## 2024-02-26 NOTE — PROGRESS NOTES
[facility]     Hospitalist Progress Note     Beata Sweeney Patient Status:  Inpatient    1950 MRN [id number]   Location [facility] 5NW-A Attending Nitish Dinh MD   Hosp Day # 3 PCP Zelalem Rendon MD     Chief Complaint: COPD    Subjective:   Patient feels well. Remains on room air.       Current medications:   ipratropium-albuterol  3 mL Nebulization QID    lidocaine-menthol  1 patch Transdermal Daily    guaiFENesin ER  600 mg Oral BID    benzonatate  100 mg Oral TID    methylphenidate  20 mg Oral 2 times per day    clopidogrel  75 mg Oral Daily    enoxaparin  40 mg Subcutaneous Daily    fluticasone furoate-vilanterol  1 puff Inhalation Daily    aspirin  325 mg Oral Daily    atorvastatin  40 mg Oral Nightly    fluticasone propionate  2 spray Each Nare Daily    umeclidinium bromide  1 puff Inhalation Daily    methylPREDNISolone  40 mg Intravenous Q8H    amLODIPine  10 mg Oral Daily       Objective:    Review of Systems:   10 point ROS completed and was negative, except for pertinent positive and negatives stated in subjective.    Vital signs:  Temp:  [97.8 °F (36.6 °C)-98.8 °F (37.1 °C)] 98.8 °F (37.1 °C)  Pulse:  [] 98  Resp:  [16-19] 19  BP: (153-175)/(56-78) 153/64  SpO2:  [93 %-98 %] 97 %  Patient Weight for the past 72 hrs:   Weight   24 1547 103 lb (46.7 kg)   24 0511 130 lb (59 kg)     Physical Exam:    General: No acute distress.   Respiratory: Diminished   Cardiovascular: S1, S2. Regular rate and rhythm.   Abdomen: Soft, nontender, nondistended.  Positive bowel sounds.  Extremities: No edema.  Neuro: AAOx3    Diagnostic Data:    Labs:  Recent Labs   Lab 24  1559 24  0727   WBC 19.6* 19.9*   HGB 14.0 13.2   MCV 83.9 85.7   .0 156.0       Recent Labs   Lab 24  1559 24  0725   * 97   BUN 26* 22   CREATSERUM 1.27* 0.97   CA 9.1 8.7   ALB 3.4  --     136   K 3.6 3.6    108   CO2 23.0 23.0   ALKPHO 100  --    AST 23  --     ALT 16  --    BILT 0.7  --    TP 7.0  --        Estimated Creatinine Clearance: 38.4 mL/min (based on SCr of 0.97 mg/dL).    No results for input(s): \"PTP\", \"INR\" in the last 168 hours.         COVID-19 Lab Results    COVID-19  Lab Results   Component Value Date    COVID19 Not Detected 02/23/2024    COVID19 Not Detected 02/23/2024       Pro-Calcitonin  Recent Labs   Lab 02/23/24  1559   PCT 0.95*       Cardiac  No results for input(s): \"TROP\", \"PBNP\" in the last 168 hours.    Creatinine Kinase  No results for input(s): \"CK\" in the last 168 hours.    Inflammatory Markers  No results for input(s): \"CRP\", \"HARVEY\", \"LDH\", \"DDIMER\" in the last 168 hours.    Recent Labs   Lab 02/23/24  1559   TROPHS 6       Imaging: Imaging data reviewed in Epic.    Medications:    ipratropium-albuterol  3 mL Nebulization QID    lidocaine-menthol  1 patch Transdermal Daily    guaiFENesin ER  600 mg Oral BID    benzonatate  100 mg Oral TID    methylphenidate  20 mg Oral 2 times per day    clopidogrel  75 mg Oral Daily    enoxaparin  40 mg Subcutaneous Daily    fluticasone furoate-vilanterol  1 puff Inhalation Daily    aspirin  325 mg Oral Daily    atorvastatin  40 mg Oral Nightly    fluticasone propionate  2 spray Each Nare Daily    umeclidinium bromide  1 puff Inhalation Daily    methylPREDNISolone  40 mg Intravenous Q8H    amLODIPine  10 mg Oral Daily       Assessment & Plan:    Acute hypoxic respiratory failure d/t COPD exacerbation d/t Rhino/Entero virus, PCT 0.95  Leukocytosis  Solumedrol > Prednisone?  BD  Nebs  Oxygen - complete O2 walk, eval need for home oxygen  Antitussives  Incentive spirometry  Flutter   Isolation  Pulmonary consult  Back pain  Analgesics as needed  Supportive measures  Left lung mass   Outpatient follow-up   AAA sp repair  Essential hypertension  Dyslipidemia  Aspirin  Plavix  Norvasc  Lipitor  Monitor hemodynamics     Supplementary Documentation:   Quality:  DVT Prophylaxis: Lovenox    Possible discharge home  today.     Plan of care discussed with patient and RN.    Nitish Dinh MD

## 2024-02-26 NOTE — PROGRESS NOTES
Patient A&Ox4.  Denies SOB/chest pain.  + conversational dyspnea.  Saturating above 90% on room air at rest.  Says she is not coughing anymore. Nebs per RT.  IV Solumedrol as ordered.  Denies nausea, no vomiting, no stool.  Purewick in place.  Norco was given for c/o pain to the left side of her back.  She was also given Klonopin per her request - \"for my anxiety\".   Safety and droplet isolation precaution in place.  Will follow.

## 2024-02-26 NOTE — CM/SW NOTE
02/26/24 1200   CM/SW Referral Data   Referral Source Social Work (self-referral)   Reason for Referral Advance Directives/POLST   Informant EMR;Clinical Staff Member     Patient is a 72 y/o female who admitted with COPD exacerbation and Mass of lower lobe of left lung. SW acknowledged order for POLST, DNR. Reviewed chart, no covid status on file and not identified in MD notes. Provided RN with blank POLST form and inquired if MD would discuss code status with patient.     SW/CM to remain available for support and/or discharge planning.    Dee Herrera KRISTIE  Discharge Planner  [phone]

## 2024-02-26 NOTE — PROGRESS NOTES
Patient maintained O2 saturation above 90% on room air overnight.  She woke up this morning complaining pain to the left side of her back.  She was offered Norco but she refused.  \"Ill just wait until after I eat.\"  Her blood pressure was 172/70 at 0514 and 167/68 at 0517. 0900 dose of Norvasc was given early.  She agreed to have Jello and applesauce now (instead of waiting for breakfast) so she can take her Norco.

## 2024-02-26 NOTE — DIETARY NOTE
[facility]   CLINICAL NUTRITION    Beata Sweeney admitted on 2/23 presents with COPD exacerbation.    PMH:  has a past medical history of COPD (chronic obstructive pulmonary disease) (HCC).     Admitting diagnosis:  COPD exacerbation (HCC) [J44.1]  Mass of lower lobe of left lung [R91.8]    Ht: 154.9 cm (5' 1\")  Wt: 59 kg (130 lb).   Body mass index is 24.56 kg/m².    Wt Readings from Last 6 Encounters:   02/24/24 59 kg (130 lb)   04/19/16 96.2 kg (212 lb)   03/23/16 90.7 kg (200 lb)   02/18/16 90.7 kg (200 lb)   08/12/15 93 kg (205 lb)   05/06/15 97.5 kg (215 lb)        Labs/Meds reviewed    Diet:       Procedures    Regular/General diet Is Patient on Accuchecks? No       Percent Meals Eaten (last 3 days)       Date/Time Percent Meals Eaten (%)    02/24/24 0859 25 %            Pt chart reviewed d/t MST score 2. Visited pt at bedside. Pt reports having a \"lousy\" appetite and decreased PO intake after her AAA surgery in November. Reports she lost wt down into 120's but appetite is finally starting to improve and now weighs 130lbs.  Nursing notes reports Percent Meals Eaten (%): 25 % intake for last meal. Denies GI symptoms at this time with last BM today. No chewing or swallowing difficulties and NKFA. Pt reports she was drinking ONS for a while after her surgery but feels she is doing better now. Continued to encourage PO intake; all questions answered at this time.    Patient is at low nutrition risk at this time.    Please consult if patient status changes or nutrition issues arise.    Spring Freeman, RD, LDN, [facility]  Clinical Dietitian  Spectra: [phone]

## 2024-02-26 NOTE — PROGRESS NOTES
02/26/24 1115   Mobility   O2 walk? Yes   SPO2% on Room Air at Rest 92   SPO2% Ambulation on Room Air 90

## 2024-02-26 NOTE — PLAN OF CARE
Problem: Patient/Family Goals  Goal: Patient/Family Long Term Goal  Description: Patient's Long Term Goal:   2/24 Days: Return home without needing supplemental O2    Interventions:  - Wean O2 as tolerated  - Pulmonary care IS/flutter  - Nebs  - IV steroids  - See additional Care Plan goals for specific interventions  Outcome: Progressing  Goal: Patient/Family Short Term Goal  Description: Patient's Short Term Goal:   2/24: Wean off Vapotherm to HFNC  02/24/2024 - wean O2  02/25/2024 - remain on room air overnight  Interventions:   - Wean O2 as tolerated  - IV steroids  - Nebs  - Up to chair for all meals/BSC   - Flutter/IS  - wean O2 to keep sats between 89 to 92  -  monitoring  - See additional Care Plan goals for specific interventions  Outcome: Progressing     Problem: RESPIRATORY - ADULT  Goal: Achieves optimal ventilation and oxygenation  Description: INTERVENTIONS:  - Assess for changes in respiratory status  - Assess for changes in mentation and behavior  - Position to facilitate oxygenation and minimize respiratory effort  - Oxygen supplementation based on oxygen saturation or ABGs  - Provide Smoking Cessation handout, if applicable  - Encourage broncho-pulmonary hygiene including cough, deep breathe, Incentive Spirometry  - Assess the need for suctioning and perform as needed  - Assess and instruct to report SOB or any respiratory difficulty  - Respiratory Therapy support as indicated  - Manage/alleviate anxiety  - Monitor for signs/symptoms of CO2 retention  Outcome: Progressing

## 2024-02-26 NOTE — DISCHARGE SUMMARY
[facility]     Beata Sweeney Patient Status:  Inpatient    1950 MRN [id number]   Location [facility] 5NW-A Attending Nitish Dinh MD   Hosp Day # 3 PCP Zelalem Rendon MD     Date of Admission: 2024  Date of Discharge:   2024    Discharge Disposition: Home    Discharge Diagnosis:  Acute hypoxic respiratory failure d/t COPD exacerbation d/t Rhino/Entero virus, PCT 0.95  Leukocytosis  Back pain  Left lung mass   AAA sp repair  Essential hypertension  Dyslipidemia    History of Present Illness: Beata Sweeney is a 73 year old female with a PMH of COPD and AAA presented to ED due to worsening SOB over the past 5 days or so. Reports dyspnea with minimal exertion, wheezing, and non-productive cough. Patient began taking some left over steroids about 5 days ago along with doing breathing treatments without much relief. Denies fever/chills. Denies N/V/D.     Brief Synopsis: Patient presented with shortness of breath. She was admitted for acute hypoxic respiratory failure d/t COPD exacerbation d/t Rhino/Entero virus with pulmonary on consult. Patient responded positively to steroids and bronchodilators. Oxygen weaned off. Plan for home in stable condition on regimen outlined below.    Lace+ Score: 59  59-90 High Risk  29-58 Medium Risk  0-28   Low Risk       TCM Follow-Up Recommendation:  LACE > 58: High Risk of readmission after discharge from the hospital.    Discharge Medication List:     Discharge Medications        START taking these medications        Instructions Prescription details   clopidogrel 75 MG Tabs  Commonly known as: Plavix  Start taking on: 2024      Take 1 tablet (75 mg total) by mouth daily.   Refills: 0     lidocaine-menthol 4-1 % Ptch  Start taking on: 2024      Place 1 patch onto the skin daily.   Quantity: 30 patch  Refills: 0            CONTINUE taking these medications        Instructions Prescription details    amLODIPine 10 MG Tabs  Commonly known as: Norvasc      Take 1 tablet (10 mg total) by mouth daily.   Refills: 0     aspirin 325 MG Tabs      Take 1 tablet (325 mg total) by mouth daily.   Refills: 0     atorvastatin 40 MG Tabs  Commonly known as: Lipitor      Take 1 tablet (40 mg total) by mouth nightly.   Refills: 0     butalbital-acetaminophen-caffeine -40 MG Tabs  Commonly known as: Fioricet      Take 1 tablet by mouth as needed. As needed for migraines   Refills: 0     clonazePAM 0.5 MG Tabs  Commonly known as: KlonoPIN      Take 1 tablet (0.5 mg total) by mouth 2 (two) times daily as needed for Anxiety.   Refills: 0     Ritalin 10 MG Tabs  Generic drug: methylphenidate      Take 1 tablet (10 mg total) by mouth 2 (two) times daily.   Refills: 0     VICODIN OR      PRN   Refills: 0            ASK your doctor about these medications        Instructions Prescription details   Advair Diskus 500-50 MCG/DOSE Aepb  Generic drug: fluticasone-salmeterol       Refills: 0     Albuterol Sulfate 108 (90 Base) MCG/ACT Aepb  Commonly known as: ProAir RespiClick  Ask about: Which instructions should I use?      Inhale 2 puffs into the lungs 4 (four) times daily as needed.   Quantity: 1 each  Refills: 0     azithromycin 250 MG Tabs  Commonly known as: Zithromax       Refills: 0     fluticasone furoate-vilanterol 100-25 MCG/ACT Aepb  Commonly known as: Breo Ellipta  Ask about: Which instructions should I use?      Inhale 1 puff into the lungs daily.   Quantity: 1 each  Refills: 11     fluticasone propionate 50 MCG/ACT Susp  Commonly known as: Flonase      2 sprays by Each Nare route daily.   Quantity: 1 Inhaler  Refills: 11     predniSONE 10 MG Tabs  Commonly known as: Deltasone  Ask about: Which instructions should I use?      Take 4 pills daily for 3 days, then take 3 pills daily for 3 days, then take 2 pills daily for 3 days, then take 1 pill daily for 3 days.   Quantity: 30 tablet  Refills: 0     Tiotropium Bromide  Monohydrate 2.5 MCG/ACT Aers  Commonly known as: Spiriva Respimat      Inhale 2 puffs into the lungs daily.   Quantity: 1 Inhaler  Refills: 11     umeclidinium bromide 62.5 MCG/ACT Aepb  Commonly known as: Incruse Ellipta  Ask about: Which instructions should I use?      Inhale 1 puff into the lungs daily.   Quantity: 1 each  Refills: 11     umeclidinium-vilanterol 62.5-25 MCG/ACT Aepb  Commonly known as: Anoro Ellipta      Inhale 1 puff into the lungs daily.   Quantity: 1 each  Refills: 11               Where to Get Your Medications        These medications were sent to [facility] #[id number] - [place], [place] - [address] AT [facility] [address] [facility], [phone], [phone]   [facility] [id number]      Phone: [phone]   lidocaine-menthol 4-1 % Ptch         [place]    Follow-up appointment:   No follow-up provider specified.  Appointments for Next 30 Days 2024 - 3/27/2024      None              -----------------------------------------------------------------------------------------------  PATIENT DISCHARGE INSTRUCTIONS: See electronic chart    Nitish Dinh MD    Total time spent on discharge plannin minutes     The  Cures Act makes medical notes like these available to patients in the interest of transparency. Please be advised this is a medical document. Medical documents are intended to carry relevant information, facts as evident, and the clinical opinion of the practitioner. The medical note is intended as peer to peer communication and may appear blunt or direct. It is written in medical language and may contain abbreviations or verbiage that are unfamiliar.

## 2024-02-26 NOTE — PROGRESS NOTES
NURSING DISCHARGE NOTE    Discharged Home via Wheelchair.  Accompanied by Support staff  Belongings Taken by patient/family.    - Removed Tele and IV   - Completed education and discharge teaching  - Patient will follow up with Pulmonology   - Patient taken home via private vehicle